# Patient Record
Sex: MALE | URBAN - METROPOLITAN AREA
[De-identification: names, ages, dates, MRNs, and addresses within clinical notes are randomized per-mention and may not be internally consistent; named-entity substitution may affect disease eponyms.]

---

## 2024-04-12 ENCOUNTER — HOSPITAL ENCOUNTER (EMERGENCY)
Facility: HOSPITAL | Age: 44
Discharge: HOME | End: 2024-04-12

## 2024-04-12 NOTE — ED NOTES
Bed: JNED-04  Expected date: 4/12/24  Expected time: 4:13 AM  Means of arrival: Ambulance  Comments:  Romy. 44 yo male. Chest pain on and off for a month. EKG is normal.